# Patient Record
Sex: MALE | Race: WHITE | NOT HISPANIC OR LATINO | URBAN - METROPOLITAN AREA
[De-identification: names, ages, dates, MRNs, and addresses within clinical notes are randomized per-mention and may not be internally consistent; named-entity substitution may affect disease eponyms.]

---

## 2017-01-04 ENCOUNTER — AMBULATORY - HEALTHEAST (OUTPATIENT)
Dept: FAMILY MEDICINE | Facility: CLINIC | Age: 57
End: 2017-01-04

## 2017-01-04 DIAGNOSIS — L72.3 SEBACEOUS CYST: ICD-10-CM

## 2017-01-04 DIAGNOSIS — D12.6 BENIGN NEOPLASM OF COLON: ICD-10-CM

## 2017-01-04 DIAGNOSIS — I10 ESSENTIAL HYPERTENSION, BENIGN: ICD-10-CM

## 2017-01-04 DIAGNOSIS — R20.0 NUMBNESS: ICD-10-CM

## 2017-01-04 DIAGNOSIS — R91.8 PULMONARY NODULES: ICD-10-CM

## 2017-01-04 DIAGNOSIS — E11.9 TYPE 2 DIABETES MELLITUS (H): ICD-10-CM

## 2017-01-04 LAB
CHOLEST SERPL-MCNC: 152 MG/DL
HBA1C MFR BLD: 7.6 % (ref 3.5–6)
LDLC SERPL CALC-MCNC: 91 MG/DL

## 2017-01-05 ENCOUNTER — AMBULATORY - HEALTHEAST (OUTPATIENT)
Dept: FAMILY MEDICINE | Facility: CLINIC | Age: 57
End: 2017-01-05

## 2017-01-09 ENCOUNTER — COMMUNICATION - HEALTHEAST (OUTPATIENT)
Dept: FAMILY MEDICINE | Facility: CLINIC | Age: 57
End: 2017-01-09

## 2017-01-09 ENCOUNTER — HOSPITAL ENCOUNTER (OUTPATIENT)
Dept: CT IMAGING | Facility: CLINIC | Age: 57
Discharge: HOME | End: 2017-01-09
Attending: FAMILY MEDICINE

## 2017-01-09 DIAGNOSIS — R91.8 PULMONARY NODULES: ICD-10-CM

## 2017-01-11 ENCOUNTER — OFFICE VISIT - HEALTHEAST (OUTPATIENT)
Dept: FAMILY MEDICINE | Facility: CLINIC | Age: 57
End: 2017-01-11

## 2017-01-11 ENCOUNTER — COMMUNICATION - HEALTHEAST (OUTPATIENT)
Dept: TELEHEALTH | Facility: CLINIC | Age: 57
End: 2017-01-11

## 2017-01-11 DIAGNOSIS — T14.8XXA WOUND INFECTION: ICD-10-CM

## 2017-01-11 DIAGNOSIS — L08.9 WOUND INFECTION: ICD-10-CM

## 2017-01-11 DIAGNOSIS — L72.3 SEBACEOUS CYST: ICD-10-CM

## 2017-01-23 ENCOUNTER — OFFICE VISIT - HEALTHEAST (OUTPATIENT)
Dept: FAMILY MEDICINE | Facility: CLINIC | Age: 57
End: 2017-01-23

## 2017-01-23 DIAGNOSIS — Z48.02 ENCOUNTER FOR REMOVAL OF SUTURES: ICD-10-CM

## 2017-01-23 ASSESSMENT — MIFFLIN-ST. JEOR: SCORE: 1999.67

## 2017-02-06 ENCOUNTER — COMMUNICATION - HEALTHEAST (OUTPATIENT)
Dept: FAMILY MEDICINE | Facility: CLINIC | Age: 57
End: 2017-02-06

## 2017-02-06 DIAGNOSIS — G47.00 INSOMNIA: ICD-10-CM

## 2017-02-06 DIAGNOSIS — I10 HTN (HYPERTENSION): ICD-10-CM

## 2017-02-06 DIAGNOSIS — E78.00 HYPERCHOLESTEROLEMIA: ICD-10-CM

## 2017-02-08 ENCOUNTER — COMMUNICATION - HEALTHEAST (OUTPATIENT)
Dept: FAMILY MEDICINE | Facility: CLINIC | Age: 57
End: 2017-02-08

## 2017-02-08 DIAGNOSIS — R20.0 NUMBNESS: ICD-10-CM

## 2017-02-08 DIAGNOSIS — G47.00 INSOMNIA: ICD-10-CM

## 2017-02-08 DIAGNOSIS — E78.00 HYPERCHOLESTEROLEMIA: ICD-10-CM

## 2017-02-08 DIAGNOSIS — I10 HTN (HYPERTENSION): ICD-10-CM

## 2017-02-10 ENCOUNTER — COMMUNICATION - HEALTHEAST (OUTPATIENT)
Dept: FAMILY MEDICINE | Facility: CLINIC | Age: 57
End: 2017-02-10

## 2017-02-10 DIAGNOSIS — I10 HTN (HYPERTENSION): ICD-10-CM

## 2017-02-10 DIAGNOSIS — G47.00 INSOMNIA: ICD-10-CM

## 2017-02-10 DIAGNOSIS — E78.00 HYPERCHOLESTEROLEMIA: ICD-10-CM

## 2017-02-10 RX ORDER — ATORVASTATIN CALCIUM 10 MG/1
TABLET, FILM COATED ORAL
Qty: 90 TABLET | Refills: 1 | Status: SHIPPED | OUTPATIENT
Start: 2017-02-10

## 2017-02-13 RX ORDER — ATENOLOL 50 MG/1
TABLET ORAL
Qty: 90 TABLET | Refills: 1 | Status: SHIPPED | OUTPATIENT
Start: 2017-02-13

## 2017-03-13 ENCOUNTER — COMMUNICATION - HEALTHEAST (OUTPATIENT)
Dept: FAMILY MEDICINE | Facility: CLINIC | Age: 57
End: 2017-03-13

## 2017-03-13 DIAGNOSIS — M10.9 GOUT: ICD-10-CM

## 2017-03-13 DIAGNOSIS — G47.00 INSOMNIA: ICD-10-CM

## 2017-05-03 ENCOUNTER — COMMUNICATION - HEALTHEAST (OUTPATIENT)
Dept: FAMILY MEDICINE | Facility: CLINIC | Age: 57
End: 2017-05-03

## 2017-05-03 DIAGNOSIS — G47.00 INSOMNIA: ICD-10-CM

## 2017-05-04 ENCOUNTER — COMMUNICATION - HEALTHEAST (OUTPATIENT)
Dept: FAMILY MEDICINE | Facility: CLINIC | Age: 57
End: 2017-05-04

## 2017-06-20 ENCOUNTER — COMMUNICATION - HEALTHEAST (OUTPATIENT)
Dept: FAMILY MEDICINE | Facility: CLINIC | Age: 57
End: 2017-06-20

## 2017-06-20 DIAGNOSIS — G47.00 INSOMNIA: ICD-10-CM

## 2017-07-15 ENCOUNTER — COMMUNICATION - HEALTHEAST (OUTPATIENT)
Dept: FAMILY MEDICINE | Facility: CLINIC | Age: 57
End: 2017-07-15

## 2017-07-15 DIAGNOSIS — G47.00 INSOMNIA: ICD-10-CM

## 2017-07-17 RX ORDER — ALPRAZOLAM 0.5 MG
TABLET ORAL
Qty: 30 TABLET | Refills: 0 | Status: SHIPPED | OUTPATIENT
Start: 2017-07-17

## 2017-08-05 ENCOUNTER — COMMUNICATION - HEALTHEAST (OUTPATIENT)
Dept: FAMILY MEDICINE | Facility: CLINIC | Age: 57
End: 2017-08-05

## 2017-08-05 DIAGNOSIS — I10 HTN (HYPERTENSION): ICD-10-CM

## 2017-08-05 DIAGNOSIS — E78.00 HYPERCHOLESTEROLEMIA: ICD-10-CM

## 2017-08-06 RX ORDER — ATORVASTATIN CALCIUM 10 MG/1
TABLET, FILM COATED ORAL
Qty: 90 TABLET | Refills: 0 | Status: SHIPPED | OUTPATIENT
Start: 2017-08-06

## 2017-08-07 RX ORDER — METOPROLOL SUCCINATE 25 MG/1
25 TABLET, EXTENDED RELEASE ORAL DAILY
Qty: 30 TABLET | Refills: 0 | Status: SHIPPED | OUTPATIENT
Start: 2017-08-07 | End: 2018-08-07

## 2017-08-18 ENCOUNTER — COMMUNICATION - HEALTHEAST (OUTPATIENT)
Dept: FAMILY MEDICINE | Facility: CLINIC | Age: 57
End: 2017-08-18

## 2017-08-18 DIAGNOSIS — M10.9 GOUT: ICD-10-CM

## 2017-08-18 DIAGNOSIS — R20.0 NUMBNESS: ICD-10-CM

## 2017-08-18 RX ORDER — GABAPENTIN 100 MG/1
CAPSULE ORAL
Qty: 120 CAPSULE | Refills: 0 | Status: SHIPPED | OUTPATIENT
Start: 2017-08-18

## 2017-08-18 RX ORDER — ALLOPURINOL 300 MG/1
TABLET ORAL
Qty: 30 TABLET | Refills: 0 | Status: SHIPPED | OUTPATIENT
Start: 2017-08-18

## 2017-08-22 ENCOUNTER — COMMUNICATION - HEALTHEAST (OUTPATIENT)
Dept: FAMILY MEDICINE | Facility: CLINIC | Age: 57
End: 2017-08-22

## 2017-08-23 ENCOUNTER — COMMUNICATION - HEALTHEAST (OUTPATIENT)
Dept: FAMILY MEDICINE | Facility: CLINIC | Age: 57
End: 2017-08-23

## 2017-08-23 DIAGNOSIS — G47.00 INSOMNIA: ICD-10-CM

## 2021-05-29 ENCOUNTER — RECORDS - HEALTHEAST (OUTPATIENT)
Dept: ADMINISTRATIVE | Facility: CLINIC | Age: 61
End: 2021-05-29

## 2021-05-30 VITALS — HEIGHT: 70 IN | BODY MASS INDEX: 37.51 KG/M2 | WEIGHT: 262 LBS

## 2021-05-30 VITALS — WEIGHT: 262.3 LBS | BODY MASS INDEX: 37.64 KG/M2

## 2021-06-01 ENCOUNTER — RECORDS - HEALTHEAST (OUTPATIENT)
Dept: ADMINISTRATIVE | Facility: CLINIC | Age: 61
End: 2021-06-01

## 2021-06-02 ENCOUNTER — RECORDS - HEALTHEAST (OUTPATIENT)
Dept: ADMINISTRATIVE | Facility: CLINIC | Age: 61
End: 2021-06-02

## 2021-06-08 NOTE — PROGRESS NOTES
Alphonso Laurent is a 56-year-old with a history of diabetes hypertension hypercholesterolemia presents today for follow-up of his sebaceous cyst excision please see previous note.  He unfortunately continues to have some drainage from his wound and this has become annoying.  He has not had much in way of pain he has had no fever or chills    We reviewed his CT that showed that his pulmonary nodules are likely benign but he does have emphysema.  He is no longer a smoker but he did smoke for many years.  I encouraged him on exercise to try and help improve his pulmonary function    Objective:  Visit Vitals     /80     Pulse 84     Skin onto his back he does have a fairly gapping area where his sebaceous cyst was excised I'm able to express some purulent bloody drainage from his wound in the center.  This is largely nontender with minimal redness    Assessment: Sebaceous cyst excision-local wound infection    Plan: Start Cefzil 500 milligrams twice a day over the next week to frequent do frequent warm soaks or showers to help debride this area.  Follow-up in another week or 2 for suture removal call if any problems or questions.

## 2021-06-08 NOTE — PROGRESS NOTES
"OV   1/23/2017    Assessment & Plan:      1. Encounter for removal of sutures   Sutures removed without difficulty and steri-strips were placed. We reviewed indications for recheck and he will call or return to clinic with any additional concerns.    Subjective:        Alphonso Laurent presents for follow up of an infected sebaceous cyst located in the mid-upper back centrally. He is s/p sebaceous cyst excision and subsequent infection. He had sutures placed 1/4/17 with the initial excision. He had some ongoing drainage up until a few days ago and denies any discomfort with the area currently. Treatment to date has included antibiotics started 12 days ago with complete relief.    Review of Systems  Pertinent items are noted in HPI.      Objective:        Visit Vitals     /90     Pulse 76     Ht 5' 10\" (1.778 m)     Wt (!) 262 lb (118.8 kg)     BMI 37.59 kg/m2       General:  alert, cooperative and no distress   Skin:  healed incision on left upper back with 3 interrupted sutures in place. No visible drainage or purulence            "

## 2021-06-08 NOTE — PROGRESS NOTES
Alphonso Laurent is a 56-year-old with a history of diabetes hypertension hypercholesterolemia obesity and pulmonary nodules who presents today for follow-up.  He has several concerns about his health.  He has been monitoring his blood sugars typically once or twice a day and they have been in the 100-120 range.  He does not usually monitoring blood pressures he denies any chest pains or shortness of breath.  He is planning on retiring in September.  He does not do much in the way of exercise.  His brother recently passed away from pancreatic cancer.  We reviewed heart disease prevention and cancer detection.  He knows he is due for colonoscopy.  He is also due for a chest CT for follow-up of his pulmonary nodules.    He has had a bump onto his back for many years and this is becoming gradually more irritating.  He would like to have it removed.  We reviewed the risks and benefits of this and his questions are answered.    He does mention a sense of some numbness and tingling that can be uncomfortable into his feet.  He is currently using gabapentin for this and we discussed potentially increasing the dose or consideration of Lyrica.  We discussed that the likely etiology is his diabetes.    Objective:  Visit Vitals     /80     Pulse 90     Temp 97.8  F (36.6  C)     Wt (!) 262 lb 4.8 oz (119 kg)     BMI 37.64 kg/m2     Neck supple without lymphadenopathy thyromegaly or bruits lungs are clear heart with regular rate and rhythm without murmur or gallop normal S1-S2 lower extremities are free of edema skin onto his back he has a 4 x 3 cm raised subcutaneous mass consistent with a sebaceous cyst.  This is currently not inflamed and nontender.    The areas clean prepped and draped 1 percentile with epinephrine is used an elliptical excision is performed and the cyst is removed.  It unfortunately comes out in pieces rather than as a hole and there seems to be moderate scar tissue surrounding this area.  He tolerates  this well.  Hemostasis is ultimately achieved and the wound is closed with 3 simple interrupted 5-0 Ethilon sutures  The areas re-cleaned and bacitracin and dressing is applied    Labs pending    Assessment: Sebaceous cyst removal-back, diabetes hypertension foot numbness likely diabetic neuropathy history of colonic polyps and pulmonary nodules    Plan: Routine wound care instructions given follow-up here in 7-10 days for suture removal otherwise as needed symptoms to watch for reviewed.    I encouraged him on diet exercise and weight loss we discussed increasing gabapentin to improve his foot symptoms.  We will call him with labs when available, he is given referrals for colonoscopy and chest CT and he'll follow up here in 6 months or as needed

## 2021-06-15 PROBLEM — R20.0 NUMBNESS: Status: ACTIVE | Noted: 2017-01-04
